# Patient Record
Sex: FEMALE | Race: WHITE | Employment: UNEMPLOYED | URBAN - METROPOLITAN AREA
[De-identification: names, ages, dates, MRNs, and addresses within clinical notes are randomized per-mention and may not be internally consistent; named-entity substitution may affect disease eponyms.]

---

## 2023-06-20 ENCOUNTER — APPOINTMENT (OUTPATIENT)
Facility: HOSPITAL | Age: 53
End: 2023-06-20

## 2023-06-20 ENCOUNTER — HOSPITAL ENCOUNTER (EMERGENCY)
Facility: HOSPITAL | Age: 53
Discharge: HOME | End: 2023-06-20
Attending: EMERGENCY MEDICINE | Admitting: EMERGENCY MEDICINE

## 2023-06-20 VITALS
BODY MASS INDEX: 27.31 KG/M2 | HEIGHT: 67 IN | DIASTOLIC BLOOD PRESSURE: 89 MMHG | WEIGHT: 174 LBS | RESPIRATION RATE: 21 BRPM | OXYGEN SATURATION: 97 % | SYSTOLIC BLOOD PRESSURE: 150 MMHG | HEART RATE: 95 BPM

## 2023-06-20 DIAGNOSIS — I10 HYPERTENSION, UNSPECIFIED TYPE: ICD-10-CM

## 2023-06-20 DIAGNOSIS — R06.00 DYSPNEA, UNSPECIFIED TYPE: Primary | ICD-10-CM

## 2023-06-20 DIAGNOSIS — R51.9 ACUTE NONINTRACTABLE HEADACHE, UNSPECIFIED HEADACHE TYPE: ICD-10-CM

## 2023-06-20 LAB
ALBUMIN SERPL-MCNC: 3.8 G/DL (ref 3.5–5.2)
ALBUMIN/GLOB SERPL: 1.4 (ref 1.1–2.2)
ALP SERPL-CCNC: 75 U/L (ref 35–104)
ALT SERPL-CCNC: 22 U/L (ref 10–35)
ANION GAP SERPL CALC-SCNC: 15 MMOL/L (ref 5–15)
AST SERPL-CCNC: 25 U/L (ref 10–35)
BASOPHILS # BLD: 0 K/UL (ref 0–1)
BASOPHILS NFR BLD: 0 % (ref 0–1)
BILIRUB SERPL-MCNC: 1 MG/DL (ref 0.2–1)
BUN SERPL-MCNC: 13 MG/DL (ref 6–20)
BUN/CREAT SERPL: 19 (ref 12–20)
CALCIUM SERPL-MCNC: 9.5 MG/DL (ref 8.6–10)
CHLORIDE SERPL-SCNC: 107 MMOL/L (ref 98–107)
CO2 SERPL-SCNC: 20 MMOL/L (ref 22–29)
COMMENT:: NORMAL
CREAT SERPL-MCNC: 0.69 MG/DL (ref 0.5–0.9)
DIFFERENTIAL METHOD BLD: ABNORMAL
EOSINOPHIL # BLD: 0 K/UL (ref 0–0.4)
EOSINOPHIL NFR BLD: 1 %
ERYTHROCYTE [DISTWIDTH] IN BLOOD BY AUTOMATED COUNT: 12.8 % (ref 11.5–14.5)
GLOBULIN SER CALC-MCNC: 2.7 G/DL (ref 2–4)
GLUCOSE SERPL-MCNC: 95 MG/DL (ref 65–100)
HCT VFR BLD AUTO: 39.7 % (ref 35–47)
HGB BLD-MCNC: 13.6 G/DL (ref 11.5–16)
IMM GRANULOCYTES # BLD AUTO: 0 K/UL (ref 0–0.04)
IMM GRANULOCYTES NFR BLD AUTO: 0 % (ref 0–0.5)
LYMPHOCYTES # BLD: 2 K/UL (ref 0.8–3.5)
LYMPHOCYTES NFR BLD: 29 % (ref 12–49)
MCH RBC QN AUTO: 29.3 PG (ref 26–34)
MCHC RBC AUTO-ENTMCNC: 34.3 G/DL (ref 30–36.5)
MCV RBC AUTO: 85.6 FL (ref 80–99)
MONOCYTES # BLD: 0.7 K/UL (ref 0–1)
MONOCYTES NFR BLD: 11 % (ref 5–13)
NEUTS SEG # BLD: 4.2 K/UL (ref 1.8–8)
NEUTS SEG NFR BLD: 59 % (ref 32–75)
NRBC # BLD: 0 K/UL (ref 0–0.01)
NRBC BLD-RTO: 0 PER 100 WBC
PLATELET # BLD AUTO: 245 K/UL (ref 150–400)
PMV BLD AUTO: 10 FL (ref 8.9–12.9)
POTASSIUM SERPL-SCNC: 4 MMOL/L (ref 3.5–5.1)
PROT SERPL-MCNC: 6.5 G/DL (ref 6.4–8.3)
RBC # BLD AUTO: 4.64 M/UL (ref 3.8–5.2)
SODIUM SERPL-SCNC: 142 MMOL/L (ref 136–145)
SPECIMEN HOLD: NORMAL
TROPONIN I BLD-MCNC: <0.04 NG/ML (ref 0–0.08)
WBC # BLD AUTO: 7.1 K/UL (ref 3.6–11)

## 2023-06-20 PROCEDURE — 84484 ASSAY OF TROPONIN QUANT: CPT

## 2023-06-20 PROCEDURE — 6360000004 HC RX CONTRAST MEDICATION: Performed by: EMERGENCY MEDICINE

## 2023-06-20 PROCEDURE — 96374 THER/PROPH/DIAG INJ IV PUSH: CPT

## 2023-06-20 PROCEDURE — 85025 COMPLETE CBC W/AUTO DIFF WBC: CPT

## 2023-06-20 PROCEDURE — 96361 HYDRATE IV INFUSION ADD-ON: CPT

## 2023-06-20 PROCEDURE — 6360000002 HC RX W HCPCS

## 2023-06-20 PROCEDURE — 99285 EMERGENCY DEPT VISIT HI MDM: CPT

## 2023-06-20 PROCEDURE — 36415 COLL VENOUS BLD VENIPUNCTURE: CPT

## 2023-06-20 PROCEDURE — 2580000003 HC RX 258

## 2023-06-20 PROCEDURE — 80053 COMPREHEN METABOLIC PANEL: CPT

## 2023-06-20 PROCEDURE — 93005 ELECTROCARDIOGRAM TRACING: CPT

## 2023-06-20 PROCEDURE — 71275 CT ANGIOGRAPHY CHEST: CPT

## 2023-06-20 RX ORDER — KETOROLAC TROMETHAMINE 15 MG/ML
15 INJECTION, SOLUTION INTRAMUSCULAR; INTRAVENOUS ONCE
Status: DISCONTINUED | OUTPATIENT
Start: 2023-06-20 | End: 2023-06-20

## 2023-06-20 RX ORDER — KETOROLAC TROMETHAMINE 15 MG/ML
15 INJECTION, SOLUTION INTRAMUSCULAR; INTRAVENOUS
Status: COMPLETED | OUTPATIENT
Start: 2023-06-20 | End: 2023-06-20

## 2023-06-20 RX ORDER — 0.9 % SODIUM CHLORIDE 0.9 %
1000 INTRAVENOUS SOLUTION INTRAVENOUS ONCE
Status: DISCONTINUED | OUTPATIENT
Start: 2023-06-20 | End: 2023-06-20

## 2023-06-20 RX ORDER — 0.9 % SODIUM CHLORIDE 0.9 %
1000 INTRAVENOUS SOLUTION INTRAVENOUS ONCE
Status: COMPLETED | OUTPATIENT
Start: 2023-06-20 | End: 2023-06-20

## 2023-06-20 RX ADMIN — KETOROLAC TROMETHAMINE 15 MG: 15 INJECTION, SOLUTION INTRAMUSCULAR; INTRAVENOUS at 11:51

## 2023-06-20 RX ADMIN — SODIUM CHLORIDE 1000 ML: 9 INJECTION, SOLUTION INTRAVENOUS at 11:51

## 2023-06-20 RX ADMIN — IOPAMIDOL 100 ML: 755 INJECTION, SOLUTION INTRAVENOUS at 12:18

## 2023-06-20 ASSESSMENT — ENCOUNTER SYMPTOMS
STRIDOR: 0
CONSTIPATION: 0
ABDOMINAL PAIN: 0
NAUSEA: 0
CHEST TIGHTNESS: 0
DIARRHEA: 0
COUGH: 0
SHORTNESS OF BREATH: 1
WHEEZING: 0
VOMITING: 0

## 2023-06-20 ASSESSMENT — PAIN DESCRIPTION - FREQUENCY: FREQUENCY: CONTINUOUS

## 2023-06-20 ASSESSMENT — PAIN DESCRIPTION - PAIN TYPE: TYPE: ACUTE PAIN

## 2023-06-20 ASSESSMENT — PAIN DESCRIPTION - LOCATION: LOCATION: HEAD

## 2023-06-20 ASSESSMENT — PAIN - FUNCTIONAL ASSESSMENT: PAIN_FUNCTIONAL_ASSESSMENT: 0-10

## 2023-06-20 ASSESSMENT — PAIN DESCRIPTION - ORIENTATION: ORIENTATION: ANTERIOR

## 2023-06-20 ASSESSMENT — PAIN DESCRIPTION - DESCRIPTORS: DESCRIPTORS: PRESSURE

## 2023-06-20 NOTE — ED NOTES
Due to current health insurance, patient denies desire for medications while in ER. PA informed.      Samia Sousa RN  06/20/23 9974

## 2023-06-20 NOTE — ED TRIAGE NOTES
Pt ambulatory into ER with cc of [facility] referral for possible PE. Pt reports persist SOB x 1 month that began when patient was taking a shower. Pt reports persistent SOB and dizziness when she lifts her arms above her head and at rest. Pt reports current dizziness and anterior HA r/t elevated BP. Pt reports hx of HTN, but denies ever being prescribed medications. Pt is  and smokes 1/2 ppd cigarettes.

## 2023-06-20 NOTE — ED NOTES
Pt transported to CT via wheelchair by rad tech. Significant other remains at bedside.      Gaye Patient, RN  06/20/23 2733

## 2023-06-20 NOTE — ED NOTES
Pt given discharge instructions, patient education, and follow up information. Pt verbalizes understanding. All questions answered. Pt discharged to home in private vehicle, ambulatory. Pt A&Ox4, RA, pain controlled.       Caroline Butcher RN  06/20/23 1773

## 2023-06-20 NOTE — DISCHARGE INSTRUCTIONS
As discussed, your workup today was negative for any emergent findings. Follow up with your PCP for further management. Return to the ER if you experience severe or worsening symptoms.

## 2023-06-20 NOTE — ED PROVIDER NOTES
management, and disposition were discussed with the attending physician, Dr. Vidya Pedro, who is in agreement with plan of care.          Angelo Rojas PA-C  06/20/23 1686

## 2023-06-21 LAB
EKG ATRIAL RATE: 120 BPM
EKG DIAGNOSIS: NORMAL
EKG P AXIS: 41 DEGREES
EKG P-R INTERVAL: 130 MS
EKG Q-T INTERVAL: 314 MS
EKG QRS DURATION: 90 MS
EKG QTC CALCULATION (BAZETT): 443 MS
EKG R AXIS: 45 DEGREES
EKG T AXIS: 50 DEGREES
EKG VENTRICULAR RATE: 120 BPM

## 2023-06-21 PROCEDURE — 93010 ELECTROCARDIOGRAM REPORT: CPT | Performed by: SPECIALIST

## 2023-09-11 ENCOUNTER — OFFICE VISIT (OUTPATIENT)
Age: 53
End: 2023-09-11

## 2023-09-11 VITALS
TEMPERATURE: 97.5 F | SYSTOLIC BLOOD PRESSURE: 123 MMHG | WEIGHT: 160 LBS | HEIGHT: 67 IN | DIASTOLIC BLOOD PRESSURE: 80 MMHG | RESPIRATION RATE: 20 BRPM | OXYGEN SATURATION: 94 % | BODY MASS INDEX: 25.11 KG/M2 | HEART RATE: 124 BPM

## 2023-09-11 DIAGNOSIS — E05.90 HYPERTHYROIDISM: Primary | ICD-10-CM

## 2023-09-11 PROCEDURE — 99204 OFFICE O/P NEW MOD 45 MIN: CPT | Performed by: INTERNAL MEDICINE

## 2023-09-11 RX ORDER — ATENOLOL 25 MG/1
25 TABLET ORAL DAILY
Qty: 30 TABLET | Refills: 0 | Status: SHIPPED | OUTPATIENT
Start: 2023-09-11

## 2023-09-11 RX ORDER — METHIMAZOLE 10 MG/1
20 TABLET ORAL DAILY
Qty: 60 TABLET | Refills: 0 | Status: SHIPPED | OUTPATIENT
Start: 2023-09-11

## 2023-09-11 RX ORDER — FLUTICASONE PROPIONATE AND SALMETEROL 100; 50 UG/1; UG/1
1 POWDER RESPIRATORY (INHALATION) EVERY 12 HOURS
COMMUNITY

## 2023-09-11 RX ORDER — LISINOPRIL 10 MG/1
1 TABLET ORAL DAILY
COMMUNITY
Start: 2023-06-30

## 2023-09-11 NOTE — PROGRESS NOTES
Endocrine Consultation    PCP: [facility]    Chief Complaint   Patient presents with    New Patient     Referred for Thyroid      HPI:  Elva Moore. Tan Sepulveda is a 46 y.o. female with  has a past medical history of Anxiety, Hypertension, and Nontoxic goiter. who is seen in consultation at the request of [facility] for evaluation of thyroid disease. [facility] for [facility]ness of breath and transferred to ER, ruled out PE. Then discharged and [facility] to [facility] for fatigue, tremors of the hand, handwriting more illegible, and found to have abnormal thyroid labs. 9/5/23   TSH <0.005    No change in the size of the neck or neck pain. +sore throat, and occasional dysphagia   Hoarseness to voice   Continued shortness of breath   +nervousness,shakiness,palpitations  No history of known radiation exposure  No FH of thyroid disease  No FH of thyroid cancer   No prior FNA of nodules. No prior use of thyroid medications   +wt loss  +frequent bowel movements  LMP 8 years ago     Full ROS completed this visit:Neg unless above specified     LABS/STUDIES:     No results found for: \"TER2BTHT\"    Lab Results   Component Value Date/Time    CREATININE 0.69 06/20/2023 11:15 AM    LABGLOM >60 06/20/2023 11:15 AM     No results found for: \"CHOL\", \"TRIG\", \"HDL\", \"LDLCALC\"  No results found for: \"TSH\"    No results found for: \"CPEPLT\", \"CPEPTIDE\"    Current Outpatient Medications   Medication Sig Dispense Refill    lisinopril (PRINIVIL;ZESTRIL) 10 MG tablet Take 1 tablet by mouth daily      fluticasone-salmeterol (ADVAIR) 100-50 MCG/ACT AEPB diskus inhaler Inhale 1 puff into the lungs in the morning and 1 puff in the evening. atenolol (TENORMIN) 25 MG tablet Take 1 tablet by mouth daily 30 tablet 0    methIMAzole (TAPAZOLE) 10 MG tablet Take 2 tablets by mouth daily 60 tablet 0     No current facility-administered medications for this visit.         Past Medical History:   Diagnosis Date

## 2023-09-11 NOTE — PATIENT INSTRUCTIONS
Labs today   Start methimazole 20 mg daily  Start atenolol 25 mg per day, if your heart rate is above 100 after 2 days, increase to 50 mg a day.      Labs in about 10 days, and nurse check for heart rate/blood pressure    Contact me if you are having any side effect to the medications or feeling worse     Follow up in clinic in 1 month

## 2023-09-11 NOTE — PROGRESS NOTES
Mona Serrano. Rhianna Coats is a 46 y.o. female here for   Chief Complaint   Patient presents with    New Patient     Referred for Thyroid        1. Have you been to the ER, urgent care clinic since your last visit? Hospitalized since your last visit? - no    2. Have you seen or consulted any other health care providers outside of the [address] [place] [address] since your last visit?   Include any pap smears or colon screening.- no

## 2023-09-11 NOTE — ASSESSMENT & PLAN NOTE
Clinically hyperthyroid  Reviewed labs   Imaging cta chest 6/2023 - no thyroid nodule in field   Has only TSH drawn, will check FT4/TT3  I reviewed with that patient normal TSH and thyroid hormone relationships. I reviewed the differential diagnosis of lab findings. At this time the etiology is Graves' disease verses toxic nodule verses thyroiditis. They do not have any physical findings to suggest Graves' disease. I sent for thyrotropin stimulating immunoglobin to see if the underlying etiology is Graves' disease. Will order thyroid US at follow up (at this time pt is self pay)   Will start methimazole 20 mg daily based on clinical suspicion. Once FT4/TT3 results today may further adjust.  Start atenolol 25 mg daily, if HR remains >100, increase to 50 mg daily   Discussed ER precautions in detail     Thyrotoxicosis     Regarding thionamide: Patient cautioned regarding the risks of agranulocytosis/neutropenia or liver toxicity. Was told that if development of fever, sore throat or sores in mouth, immediately contact me or PCP to check white blood cell count. If development of nausea, vomiting or scleral icterus, immediately contact me or PCP to check liver function. Patient informed that if the side effects develop and the drug is discontinued the problem generally recovers within a few days without additional therapy but if the drug is continued can develop serious, even life-threatening infection or liver failure.

## 2023-09-13 ENCOUNTER — TELEPHONE (OUTPATIENT)
Age: 53
End: 2023-09-13

## 2023-09-13 NOTE — TELEPHONE ENCOUNTER
----- Message from Nasra Lundy MD sent at 9/12/2023  2:58 PM EDT -----  Hello - I called patient no answer. I requested she have urgent labs drawn after visit yesterday, no results back yet and does not say in process.    Please touch base w/patient regarding status of labs, let me know,   Thanks

## 2023-09-14 LAB
T4 FREE SERPL-MCNC: 6.77 NG/DL (ref 0.82–1.77)
TSI SER-ACNC: 84 IU/L (ref 0–0.55)

## 2023-09-14 NOTE — RESULT ENCOUNTER NOTE
Pt feeling much better  Resting HR 90s   Only taking MMI 10 mg daily, will increase to 20 mg daily   To alert me if resting Hrs >90  Labs before next visit

## 2023-09-14 NOTE — TELEPHONE ENCOUNTER
Pt called and cancelled follow up appt with the nurse. She said her PCP said this was okay and patient needed to be at work. She has kept her October appt.

## 2023-09-22 DIAGNOSIS — E05.90 HYPERTHYROIDISM: Primary | ICD-10-CM

## 2023-10-11 ENCOUNTER — OFFICE VISIT (OUTPATIENT)
Age: 53
End: 2023-10-11

## 2023-10-11 VITALS
DIASTOLIC BLOOD PRESSURE: 89 MMHG | OXYGEN SATURATION: 99 % | HEART RATE: 83 BPM | BODY MASS INDEX: 24.8 KG/M2 | HEIGHT: 67 IN | TEMPERATURE: 98 F | SYSTOLIC BLOOD PRESSURE: 139 MMHG | RESPIRATION RATE: 18 BRPM | WEIGHT: 158 LBS

## 2023-10-11 DIAGNOSIS — E05.90 HYPERTHYROIDISM: ICD-10-CM

## 2023-10-11 DIAGNOSIS — E05.90 HYPERTHYROIDISM: Primary | ICD-10-CM

## 2023-10-11 LAB
ALBUMIN SERPL-MCNC: 4 G/DL (ref 3.5–5)
ALBUMIN/GLOB SERPL: 1.3 (ref 1.1–2.2)
ALP SERPL-CCNC: 142 U/L (ref 45–117)
ALT SERPL-CCNC: 26 U/L (ref 12–78)
AST SERPL-CCNC: 21 U/L (ref 15–37)
BILIRUB DIRECT SERPL-MCNC: 0.2 MG/DL (ref 0–0.2)
BILIRUB SERPL-MCNC: 1 MG/DL (ref 0.2–1)
GLOBULIN SER CALC-MCNC: 3.2 G/DL (ref 2–4)
PROT SERPL-MCNC: 7.2 G/DL (ref 6.4–8.2)

## 2023-10-11 PROCEDURE — 99214 OFFICE O/P EST MOD 30 MIN: CPT | Performed by: INTERNAL MEDICINE

## 2023-10-11 RX ORDER — METHIMAZOLE 10 MG/1
20 TABLET ORAL DAILY
Qty: 60 TABLET | Refills: 1 | Status: SHIPPED | OUTPATIENT
Start: 2023-10-11 | End: 2023-10-11

## 2023-10-11 RX ORDER — METHIMAZOLE 10 MG/1
20 TABLET ORAL DAILY
Qty: 180 TABLET | Refills: 0 | Status: SHIPPED | OUTPATIENT
Start: 2023-10-11 | End: 2023-10-12

## 2023-10-11 RX ORDER — ATENOLOL 25 MG/1
25 TABLET ORAL DAILY
Qty: 90 TABLET | Refills: 1 | Status: SHIPPED | OUTPATIENT
Start: 2023-10-11

## 2023-10-11 NOTE — ASSESSMENT & PLAN NOTE
Clinically hyperthyroid, milder than prior   Reviewed labs   Imaging cta chest 6/2023 - no thyroid nodule in field   Repeat TFTs today, hepatic profile   TSI Ab+ - Graves disease  Advised ophtho eval   Refilled Atenolol 25 mg daily   To continue methimazole 20 mg daily until labs back, then adjust  Check thyroid ultrasound, pt would like to wait until she has insurance   Regarding thionamide: Patient cautioned regarding the risks of agranulocytosis/neutropenia or liver toxicity. Was told that if development of fever, sore throat or sores in mouth, immediately contact me or PCP to check white blood cell count. If development of nausea, vomiting or scleral icterus, immediately contact me or PCP to check liver function. Patient informed that if the side effects develop and the drug is discontinued the problem generally recovers within a few days without additional therapy but if the drug is continued can develop serious, even life-threatening infection or liver failure.

## 2023-10-12 LAB
T4 FREE SERPL-MCNC: 0.6 NG/DL (ref 0.8–1.5)
TSH SERPL DL<=0.05 MIU/L-ACNC: <0.01 UIU/ML (ref 0.36–3.74)

## 2023-10-12 RX ORDER — METHIMAZOLE 10 MG/1
10 TABLET ORAL DAILY
Qty: 180 TABLET | Refills: 0
Start: 2023-10-12

## 2023-10-12 NOTE — RESULT ENCOUNTER NOTE
your thyroid levels are LOW. Please stop the methimazole for the next 3 days. Then, decrease the dose to just 10 mg once a day and repeat labs in 1 month. Stop atenolol.

## 2023-11-10 ENCOUNTER — NURSE ONLY (OUTPATIENT)
Age: 53
End: 2023-11-10

## 2023-11-10 DIAGNOSIS — E05.90 HYPERTHYROIDISM: ICD-10-CM

## 2023-11-10 LAB
T4 FREE SERPL-MCNC: 1.1 NG/DL (ref 0.8–1.5)
TSH SERPL DL<=0.05 MIU/L-ACNC: <0.01 UIU/ML (ref 0.36–3.74)

## 2023-11-13 DIAGNOSIS — E05.90 HYPERTHYROIDISM: Primary | ICD-10-CM

## 2023-12-20 ENCOUNTER — NURSE ONLY (OUTPATIENT)
Age: 53
End: 2023-12-20

## 2023-12-20 DIAGNOSIS — E05.90 HYPERTHYROIDISM: ICD-10-CM

## 2024-02-27 ENCOUNTER — TELEPHONE (OUTPATIENT)
Age: 54
End: 2024-02-27

## 2024-02-27 NOTE — TELEPHONE ENCOUNTER
Attempted to call. Unsuccessful. Left msg for Michela Llanos to give us a call back at the office. A callback number was left.

## 2024-02-27 NOTE — TELEPHONE ENCOUNTER
Informed pt of Dr. Banuelos's note. Pt verbalized understanding with no further questions or concerns at this time.

## 2024-02-27 NOTE — TELEPHONE ENCOUNTER
Patient came in today for scheduled labs and asked to reschedule her follow up until first week of May. Patient asking if she will need to do another set of labs prior to that appointment

## 2024-02-28 ENCOUNTER — TELEPHONE (OUTPATIENT)
Age: 54
End: 2024-02-28

## 2024-02-28 DIAGNOSIS — E05.90 HYPERTHYROIDISM: Primary | ICD-10-CM

## 2024-02-28 RX ORDER — METHIMAZOLE 5 MG/1
15 TABLET ORAL DAILY
Qty: 90 TABLET | Refills: 1 | Status: SHIPPED | OUTPATIENT
Start: 2024-02-28

## 2024-02-28 NOTE — TELEPHONE ENCOUNTER
Pt stated she currently taking methimazole 7.5mg daily and also stated she will need a refill if Dr. Banuelos going to change dose.

## 2024-02-28 NOTE — TELEPHONE ENCOUNTER
Informed pt of Dr. Banuelos's note. Pt verbalized understanding. Pt stated she reschedule her appt from 3/29/24 to May 2, 2024 because of transportation issue. Pt wants to know if doctor would like to see her sooner.

## 2024-02-28 NOTE — TELEPHONE ENCOUNTER
Yes increase methimazole to 15 mg daily  New rx sent  Repeat labs in 1 month, before appointment with me on 3/29/24. Keep appointment.

## 2024-02-28 NOTE — TELEPHONE ENCOUNTER
Patient unable to respond on my chart she is taking methimazole 7.5 daily. If any changes she will need new rx to pharmacy please advise patient. Fine to send instructions through my chart or call

## 2024-02-28 NOTE — TELEPHONE ENCOUNTER
----- Message from Raissa Banuelos MD sent at 2/28/2024  9:24 AM EST -----  Thyroid level very overactive  What dose of methimazole have you been taking over the past 3 weeks?

## 2024-03-12 ENCOUNTER — NURSE ONLY (OUTPATIENT)
Age: 54
End: 2024-03-12

## 2024-03-12 DIAGNOSIS — E05.90 HYPERTHYROIDISM: ICD-10-CM

## 2024-03-13 LAB
ALBUMIN SERPL-MCNC: 3.7 G/DL (ref 3.5–5)
ALBUMIN/GLOB SERPL: 1.2 (ref 1.1–2.2)
ALP SERPL-CCNC: 158 U/L (ref 45–117)
ALT SERPL-CCNC: 23 U/L (ref 12–78)
AST SERPL-CCNC: 15 U/L (ref 15–37)
BASOPHILS # BLD: 0 K/UL (ref 0–0.1)
BASOPHILS NFR BLD: 0 % (ref 0–1)
BILIRUB DIRECT SERPL-MCNC: 0.2 MG/DL (ref 0–0.2)
BILIRUB SERPL-MCNC: 0.8 MG/DL (ref 0.2–1)
DIFFERENTIAL METHOD BLD: NORMAL
EOSINOPHIL # BLD: 0.1 K/UL (ref 0–0.4)
EOSINOPHIL NFR BLD: 1 % (ref 0–7)
ERYTHROCYTE [DISTWIDTH] IN BLOOD BY AUTOMATED COUNT: 13.3 % (ref 11.5–14.5)
GLOBULIN SER CALC-MCNC: 3.1 G/DL (ref 2–4)
HCT VFR BLD AUTO: 42.3 % (ref 35–47)
HGB BLD-MCNC: 14.6 G/DL (ref 11.5–16)
IMM GRANULOCYTES # BLD AUTO: 0 K/UL (ref 0–0.04)
IMM GRANULOCYTES NFR BLD AUTO: 0 % (ref 0–0.5)
LYMPHOCYTES # BLD: 2.1 K/UL (ref 0.8–3.5)
LYMPHOCYTES NFR BLD: 29 % (ref 12–49)
MCH RBC QN AUTO: 30.6 PG (ref 26–34)
MCHC RBC AUTO-ENTMCNC: 34.5 G/DL (ref 30–36.5)
MCV RBC AUTO: 88.7 FL (ref 80–99)
MONOCYTES # BLD: 0.7 K/UL (ref 0–1)
MONOCYTES NFR BLD: 9 % (ref 5–13)
NEUTS SEG # BLD: 4.4 K/UL (ref 1.8–8)
NEUTS SEG NFR BLD: 61 % (ref 32–75)
NRBC # BLD: 0 K/UL (ref 0–0.01)
NRBC BLD-RTO: 0 PER 100 WBC
PLATELET # BLD AUTO: 304 K/UL (ref 150–400)
PMV BLD AUTO: 10.3 FL (ref 8.9–12.9)
PROT SERPL-MCNC: 6.8 G/DL (ref 6.4–8.2)
RBC # BLD AUTO: 4.77 M/UL (ref 3.8–5.2)
T4 FREE SERPL-MCNC: 1.9 NG/DL (ref 0.8–1.5)
TSH SERPL DL<=0.05 MIU/L-ACNC: <0.01 UIU/ML (ref 0.36–3.74)
WBC # BLD AUTO: 7.2 K/UL (ref 3.6–11)

## 2024-03-14 LAB — T3 SERPL-MCNC: 363 NG/DL (ref 71–180)

## 2024-03-15 ENCOUNTER — TELEPHONE (OUTPATIENT)
Age: 54
End: 2024-03-15

## 2024-03-15 DIAGNOSIS — E05.90 HYPERTHYROIDISM: Primary | ICD-10-CM

## 2024-03-15 NOTE — TELEPHONE ENCOUNTER
----- Message from Monica De León LPN sent at 3/15/2024  2:46 PM EDT -----  Regarding: FW: Michela Llanos   Contact: [phone]    ----- Message -----  From: Michela Llanos  Sent: 3/15/2024   2:39 PM EDT  To: #  Subject: Michela Llanos                                     Since February 27th it has been 15 mg as you prescribed.

## 2024-04-30 ENCOUNTER — NURSE ONLY (OUTPATIENT)
Age: 54
End: 2024-04-30

## 2024-04-30 DIAGNOSIS — E05.90 HYPERTHYROIDISM: ICD-10-CM

## 2024-05-01 LAB
T4 FREE SERPL-MCNC: 0.7 NG/DL (ref 0.8–1.5)
TSH SERPL DL<=0.05 MIU/L-ACNC: <0.01 UIU/ML (ref 0.36–3.74)

## 2024-05-02 ENCOUNTER — OFFICE VISIT (OUTPATIENT)
Age: 54
End: 2024-05-02

## 2024-05-02 VITALS
HEIGHT: 65 IN | SYSTOLIC BLOOD PRESSURE: 131 MMHG | HEART RATE: 95 BPM | OXYGEN SATURATION: 97 % | DIASTOLIC BLOOD PRESSURE: 86 MMHG | BODY MASS INDEX: 25.19 KG/M2 | WEIGHT: 151.2 LBS | TEMPERATURE: 98.8 F

## 2024-05-02 DIAGNOSIS — E05.90 HYPERTHYROIDISM: Primary | ICD-10-CM

## 2024-05-02 DIAGNOSIS — E01.0 THYROMEGALY: ICD-10-CM

## 2024-05-02 PROCEDURE — 99214 OFFICE O/P EST MOD 30 MIN: CPT | Performed by: INTERNAL MEDICINE

## 2024-05-02 RX ORDER — METHIMAZOLE 5 MG/1
10 TABLET ORAL DAILY
Qty: 180 TABLET | Refills: 0 | Status: SHIPPED | OUTPATIENT
Start: 2024-05-02

## 2024-05-02 NOTE — PROGRESS NOTES
Michela Llanos is a 53 y.o. female here for   Chief Complaint   Patient presents with    Hyperthyroidism       1. Have you been to the ER, urgent care clinic since your last visit?  Hospitalized since your last visit? -NO    2. Have you seen or consulted any other health care providers outside of the [facility] since your last visit?  Include any pap smears or colon screening.-NO

## 2024-05-02 NOTE — PATIENT INSTRUCTIONS
I have ordered an ultrasound - please call the number [phone] to speak with the scheduling team directly.

## 2024-05-02 NOTE — ASSESSMENT & PLAN NOTE
Improving  Decrease methimazole to 10 mg daily   Imaging cta chest 6/2023 - no thyroid nodule in field   TSI Ab+ - Graves disease  Repeat labs in 5 weeks   Advised ophtho eval   Check thyroid ultrasound  Regarding thionamide: Patient cautioned regarding the risks of agranulocytosis/neutropenia or liver toxicity.  Was told that if development of fever, sore throat or sores in mouth, immediately contact me or PCP to check white blood cell count.  If development of nausea, vomiting or scleral icterus, immediately contact me or PCP to check liver function.  Patient informed that if the side effects develop and the drug is discontinued the problem generally recovers within a few days without additional therapy but if the drug is continued can develop serious, even life-threatening infection or liver failure.

## 2024-05-02 NOTE — PROGRESS NOTES
Follow up     PCP: [facility], Pcp    Chief Complaint   Patient presents with    Hyperthyroidism     HPI:  Michela Llanos is a 53 y.o. female with  has a past medical history of Anxiety, Hypertension, and Nontoxic goiter. Here for follow up of hyperthyroidism.     Since last visit  Feeling much better   On methimazole 15 mg daily  Feeling well, gaining weight, hair growing back   Tremors improving   BRIEF ROS   Gen: no fevers/chills  CV: no chest pain  Resp: no shortness of breath, cough   GI: no nausea, emesis, abdominal pain  Neuro: no confusion     Initial visit notes (9/2023)   [facility] breath and transferred to ER, ruled out PE. Then discharged and went [facility] to [facility] for fatigue, tremors of the hand, handwriting more illegible, and found to have abnormal thyroid labs.   9/5/23   TSH <0.005  No change in the size of the neck or neck pain.  +sore throat, and occasional dysphagia   Hoarseness to voice   Continued shortness of breath   +nervousness,shakiness,palpitations  No history of known radiation exposure  No FH of thyroid disease  No FH of thyroid cancer   No prior FNA of nodules.  No prior use of thyroid medications   +wt loss  +frequent bowel movements  LMP 8 years ago     LABS/STUDIES:     No results found for: \"[id number]\"  Lab Results   Component Value Date/Time    CREATININE 0.69 06/20/2023 11:15 AM   No results found for: \"CHOL\", \"TRIG\", \"HDL\"  No results found for: \"TSH\"  No results found for: \"CPEPLT\", \"CPEPTIDE\"    Current Outpatient Medications   Medication Sig Dispense Refill    methIMAzole (TAPAZOLE) 5 MG tablet Take 2 tablets by mouth daily 180 tablet 0    VITAMIN D PO Take 5,000 Units by mouth daily      lisinopril (PRINIVIL;ZESTRIL) 10 MG tablet Take 1 tablet by mouth daily       No current facility-administered medications for this visit.        Past Medical History:   Diagnosis Date    Anxiety     Hypertension     Nontoxic goiter       No past

## 2024-07-01 ENCOUNTER — NURSE ONLY (OUTPATIENT)
Age: 54
End: 2024-07-01

## 2024-07-01 DIAGNOSIS — E05.90 HYPERTHYROIDISM: ICD-10-CM

## 2024-07-01 LAB
ALBUMIN SERPL-MCNC: 3.6 G/DL (ref 3.5–5)
ALBUMIN/GLOB SERPL: 1.2 (ref 1.1–2.2)
ALP SERPL-CCNC: 149 U/L (ref 45–117)
ALT SERPL-CCNC: 26 U/L (ref 12–78)
AST SERPL-CCNC: 23 U/L (ref 15–37)
BILIRUB DIRECT SERPL-MCNC: 0.2 MG/DL (ref 0–0.2)
BILIRUB SERPL-MCNC: 0.9 MG/DL (ref 0.2–1)
GLOBULIN SER CALC-MCNC: 3 G/DL (ref 2–4)
PROT SERPL-MCNC: 6.6 G/DL (ref 6.4–8.2)
T4 FREE SERPL-MCNC: 1.5 NG/DL (ref 0.8–1.5)
TSH SERPL DL<=0.05 MIU/L-ACNC: <0.01 UIU/ML (ref 0.36–3.74)

## 2024-07-03 DIAGNOSIS — E05.90 HYPERTHYROIDISM: Primary | ICD-10-CM

## 2024-07-03 DIAGNOSIS — E05.90 HYPERTHYROIDISM: ICD-10-CM

## 2024-07-03 RX ORDER — METHIMAZOLE 5 MG/1
TABLET ORAL
Qty: 180 TABLET | Refills: 0 | Status: SHIPPED | OUTPATIENT
Start: 2024-07-03

## 2024-10-30 ENCOUNTER — LAB (OUTPATIENT)
Age: 54
End: 2024-10-30

## 2024-10-30 DIAGNOSIS — E05.90 HYPERTHYROIDISM: ICD-10-CM

## 2024-10-30 LAB
T4 FREE SERPL-MCNC: 1.7 NG/DL (ref 0.8–1.5)
TSH SERPL DL<=0.05 MIU/L-ACNC: <0.01 UIU/ML (ref 0.36–3.74)

## 2024-11-04 ENCOUNTER — OFFICE VISIT (OUTPATIENT)
Age: 54
End: 2024-11-04

## 2024-11-04 VITALS
HEIGHT: 65 IN | SYSTOLIC BLOOD PRESSURE: 132 MMHG | DIASTOLIC BLOOD PRESSURE: 74 MMHG | TEMPERATURE: 98.8 F | WEIGHT: 156.2 LBS | HEART RATE: 89 BPM | BODY MASS INDEX: 26.02 KG/M2 | OXYGEN SATURATION: 97 %

## 2024-11-04 DIAGNOSIS — E05.90 HYPERTHYROIDISM: Primary | ICD-10-CM

## 2024-11-04 DIAGNOSIS — E01.0 THYROMEGALY: ICD-10-CM

## 2024-11-04 PROCEDURE — 99213 OFFICE O/P EST LOW 20 MIN: CPT | Performed by: INTERNAL MEDICINE

## 2024-11-04 RX ORDER — METHIMAZOLE 5 MG/1
7.5 TABLET ORAL DAILY
Qty: 135 TABLET | Refills: 0 | Status: SHIPPED | OUTPATIENT
Start: 2024-11-04

## 2024-11-04 NOTE — PROGRESS NOTES
Michela Llanos is a 54 y.o. female here for   Chief Complaint   Patient presents with    Hyperthyroidism       1. Have you been to the ER, urgent care clinic since your last visit?  Hospitalized since your last visit? -NO    2. Have you seen or consulted any other health care providers outside of the [facility] since your last visit?  Include any pap smears or colon screening.-NO

## 2024-11-04 NOTE — PROGRESS NOTES
Follow up     PCP: [facility], Pcp    Chief Complaint   Patient presents with    Hyperthyroidism     HPI:  Michela Llanos is a 54 y.o. female with  has a past medical history of Anxiety, Hypertension, and Nontoxic goiter. Here for follow up of hyperthyroidism.     Since last visit  Non medical stressors   Feels making health worse  More anxiety   On methimazole 5 mg daily     BRIEF ROS   Gen: no fevers/chills  CV: no chest pain  Resp: + shortness of breath, no cough   GI: no nausea, emesis, abdominal pain  Neuro: no confusion     Initial visit notes (9/2023)   [facility] for [facility]ness of breath and transferred to ER, ruled out PE. Then discharged and [facility] for fatigue, tremors of the hand, handwriting more illegible, and found to have abnormal thyroid labs.   9/5/23   TSH <0.005  No change in the size of the neck or neck pain.  +sore throat, and occasional dysphagia   Hoarseness to voice   Continued shortness of breath   +nervousness,shakiness,palpitations  No history of known radiation exposure  No FH of thyroid disease  No FH of thyroid cancer   No prior FNA of nodules.  No prior use of thyroid medications   +wt loss  +frequent bowel movements  LMP 8 years ago     LABS/STUDIES:     No results found for: \"[id number]\"  Lab Results   Component Value Date/Time    CREATININE 0.69 06/20/2023 11:15 AM    LABGLOM >60 06/20/2023 11:15 AM   No results found for: \"CHOL\", \"TRIG\", \"HDL\"  Lab Results   Component Value Date/Time    TSH <0.01 10/30/2024 09:24 AM     No results found for: \"CPEPLT\", \"CPEPTIDE\"    Current Outpatient Medications   Medication Sig Dispense Refill    VITAMIN D PO Take 5,000 Units by mouth daily      lisinopril (PRINIVIL;ZESTRIL) 10 MG tablet Take 1 tablet by mouth daily      methIMAzole (TAPAZOLE) 5 MG tablet Take 1.5 tablets by mouth daily 135 tablet 0     No current facility-administered medications for this visit.        Past Medical History:   Diagnosis Date

## 2024-12-27 ENCOUNTER — LAB (OUTPATIENT)
Age: 54
End: 2024-12-27

## 2024-12-27 DIAGNOSIS — E05.90 HYPERTHYROIDISM: ICD-10-CM

## 2024-12-27 LAB
T4 FREE SERPL-MCNC: 1.4 NG/DL (ref 0.8–1.5)
TSH SERPL DL<=0.05 MIU/L-ACNC: <0.01 UIU/ML (ref 0.36–3.74)

## 2024-12-30 ENCOUNTER — TELEPHONE (OUTPATIENT)
Age: 54
End: 2024-12-30

## 2024-12-30 DIAGNOSIS — E05.90 HYPERTHYROIDISM: ICD-10-CM

## 2024-12-30 RX ORDER — METHIMAZOLE 10 MG/1
10 TABLET ORAL DAILY
Qty: 90 TABLET | Refills: 0 | Status: SHIPPED | OUTPATIENT
Start: 2024-12-30

## 2024-12-30 NOTE — TELEPHONE ENCOUNTER
Spoke to Ms. Llanos. 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 I explained to her Dr. Banuelos's note. She said she will make appt to get her labs done within 1 month here at the office. I told her I would let Dr. Banuelos know what happened and get confirmation about continuing to take the 7.5mg or increasing to 10mg and call her back with an answer. Ms. Llanos gave permission to leave details on voicemail.

## 2024-12-30 NOTE — TELEPHONE ENCOUNTER
----- Message from Dr. Raissa Banuelos MD sent at 12/30/2024  2:29 PM EST -----  Thyroid level mild overactive  Taking methimazole 7.5 mg daily?  If so, increase to 10 mg daily  Repeat TSH and free t4 in 1 month.   Meds and labs ordered.

## 2024-12-31 NOTE — TELEPHONE ENCOUNTER
Pt was informed of Dr. Banuelos notes(Ok then please increase the methimazole to 7.5 mg daily (one and a half tabs) With repeat labs in 1 month ) via [employer] message.

## 2025-04-09 ENCOUNTER — LAB (OUTPATIENT)
Age: 55
End: 2025-04-09

## 2025-04-09 DIAGNOSIS — E05.90 HYPERTHYROIDISM: ICD-10-CM

## 2025-04-10 LAB
T4 FREE SERPL-MCNC: 1.6 NG/DL (ref 0.8–1.5)
TSH SERPL DL<=0.05 MIU/L-ACNC: <0.01 UIU/ML (ref 0.36–3.74)

## 2025-04-11 ENCOUNTER — OFFICE VISIT (OUTPATIENT)
Age: 55
End: 2025-04-11

## 2025-04-11 VITALS
SYSTOLIC BLOOD PRESSURE: 117 MMHG | BODY MASS INDEX: 27.16 KG/M2 | TEMPERATURE: 97.3 F | HEIGHT: 65 IN | HEART RATE: 93 BPM | OXYGEN SATURATION: 98 % | DIASTOLIC BLOOD PRESSURE: 78 MMHG | WEIGHT: 163 LBS

## 2025-04-11 DIAGNOSIS — E05.90 HYPERTHYROIDISM: Primary | ICD-10-CM

## 2025-04-11 DIAGNOSIS — E01.0 THYROMEGALY: ICD-10-CM

## 2025-04-11 PROCEDURE — 99214 OFFICE O/P EST MOD 30 MIN: CPT | Performed by: INTERNAL MEDICINE

## 2025-04-11 RX ORDER — METHIMAZOLE 10 MG/1
10 TABLET ORAL DAILY
Qty: 90 TABLET | Refills: 0 | Status: SHIPPED | OUTPATIENT
Start: 2025-04-11

## 2025-04-11 NOTE — PROGRESS NOTES
Michela Llanos is a 54 y.o. female here for No chief complaint on file.      1. Have you been to the ER, urgent care clinic since your last visit?  Hospitalized since your last visit? -No    2. Have you seen or consulted any other health care providers outside of the [facility] since your last visit?  Include any pap smears or colon screening.-No      
informed that if the side effects develop and the drug is discontinued the problem generally recovers within a few days without additional therapy but if the drug is continued can develop serious, even life-threatening infection or liver failure.  2. Thyromegaly  Comments:  complete US    Ok with portal for messages, prefers this.     Return in about 5 months (around 9/11/2025).    Thank you for allowing me to participate in the care of this patient.

## 2025-05-09 ENCOUNTER — LAB (OUTPATIENT)
Age: 55
End: 2025-05-09

## 2025-05-09 DIAGNOSIS — E05.90 HYPERTHYROIDISM: ICD-10-CM

## 2025-05-10 LAB
ALBUMIN SERPL-MCNC: 3.9 G/DL (ref 3.5–5)
ALBUMIN/GLOB SERPL: 1.3 (ref 1.1–2.2)
ALP SERPL-CCNC: 112 U/L (ref 45–117)
ALT SERPL-CCNC: 21 U/L (ref 12–78)
AST SERPL-CCNC: 19 U/L (ref 15–37)
BILIRUB DIRECT SERPL-MCNC: 0.2 MG/DL (ref 0–0.2)
BILIRUB SERPL-MCNC: 0.9 MG/DL (ref 0.2–1)
ERYTHROCYTE [DISTWIDTH] IN BLOOD BY AUTOMATED COUNT: 13.6 % (ref 11.5–14.5)
GLOBULIN SER CALC-MCNC: 3 G/DL (ref 2–4)
HCT VFR BLD AUTO: 44.4 % (ref 35–47)
HGB BLD-MCNC: 14.8 G/DL (ref 11.5–16)
MCH RBC QN AUTO: 30.5 PG (ref 26–34)
MCHC RBC AUTO-ENTMCNC: 33.3 G/DL (ref 30–36.5)
MCV RBC AUTO: 91.4 FL (ref 80–99)
NRBC # BLD: 0 K/UL (ref 0–0.01)
NRBC BLD-RTO: 0 PER 100 WBC
PLATELET # BLD AUTO: 285 K/UL (ref 150–400)
PMV BLD AUTO: 10.5 FL (ref 8.9–12.9)
PROT SERPL-MCNC: 6.9 G/DL (ref 6.4–8.2)
RBC # BLD AUTO: 4.86 M/UL (ref 3.8–5.2)
T4 FREE SERPL-MCNC: 1.2 NG/DL (ref 0.8–1.5)
TSH SERPL DL<=0.05 MIU/L-ACNC: <0.01 UIU/ML (ref 0.36–3.74)
WBC # BLD AUTO: 6.6 K/UL (ref 3.6–11)

## 2025-05-12 ENCOUNTER — RESULTS FOLLOW-UP (OUTPATIENT)
Age: 55
End: 2025-05-12

## 2025-05-12 DIAGNOSIS — E05.90 HYPERTHYROIDISM: Primary | ICD-10-CM

## 2025-05-27 ENCOUNTER — TELEPHONE (OUTPATIENT)
Age: 55
End: 2025-05-27

## 2025-05-27 DIAGNOSIS — E01.0 THYROMEGALY: ICD-10-CM

## 2025-05-27 DIAGNOSIS — E05.90 HYPERTHYROIDISM: Primary | ICD-10-CM

## 2025-05-27 NOTE — TELEPHONE ENCOUNTER
I attempted to call [facility] Scheduling team and was placed on hold for 30 minutes, someone picked up the phone and hung it up. I called again and was told order needs to be re-enter because they cannot see order on their end.

## 2025-05-27 NOTE — TELEPHONE ENCOUNTER
Patient stated the department cannot see the ultrasound order can you please make sure the order is put in correctly. They did go ahead and schedule it but cannot pull the actual order.

## 2025-06-30 ENCOUNTER — LAB (OUTPATIENT)
Age: 55
End: 2025-06-30

## 2025-06-30 DIAGNOSIS — E05.90 HYPERTHYROIDISM: ICD-10-CM

## 2025-07-01 ENCOUNTER — HOSPITAL ENCOUNTER (OUTPATIENT)
Facility: HOSPITAL | Age: 55
Discharge: HOME | End: 2025-07-04
Attending: INTERNAL MEDICINE

## 2025-07-01 DIAGNOSIS — E05.90 HYPERTHYROIDISM: ICD-10-CM

## 2025-07-01 DIAGNOSIS — E01.0 THYROMEGALY: ICD-10-CM

## 2025-07-01 LAB
T4 FREE SERPL-MCNC: 1.2 NG/DL (ref 0.8–1.5)
TSH SERPL DL<=0.05 MIU/L-ACNC: 0.01 UIU/ML (ref 0.36–3.74)

## 2025-07-01 PROCEDURE — 76536 US EXAM OF HEAD AND NECK: CPT

## 2025-07-02 ENCOUNTER — RESULTS FOLLOW-UP (OUTPATIENT)
Age: 55
End: 2025-07-02

## 2025-07-02 DIAGNOSIS — E05.90 HYPERTHYROIDISM: ICD-10-CM

## 2025-07-02 DIAGNOSIS — E05.90 HYPERTHYROIDISM: Primary | ICD-10-CM

## 2025-07-02 RX ORDER — METHIMAZOLE 10 MG/1
10 TABLET ORAL DAILY
Qty: 90 TABLET | Refills: 3 | Status: CANCELLED | OUTPATIENT
Start: 2025-07-02

## 2025-07-02 RX ORDER — METHIMAZOLE 5 MG/1
TABLET ORAL
Qty: 72 TABLET | Refills: 1 | Status: SHIPPED | OUTPATIENT
Start: 2025-07-02